# Patient Record
Sex: FEMALE | Race: WHITE | Employment: OTHER | URBAN - METROPOLITAN AREA
[De-identification: names, ages, dates, MRNs, and addresses within clinical notes are randomized per-mention and may not be internally consistent; named-entity substitution may affect disease eponyms.]

---

## 2017-02-14 PROBLEM — E78.00 HYPERCHOLESTEROLEMIA: Status: ACTIVE | Noted: 2017-02-14

## 2017-09-26 PROBLEM — F02.80 ALZHEIMER'S DEMENTIA WITHOUT BEHAVIORAL DISTURBANCE (HCC): Status: ACTIVE | Noted: 2017-09-26

## 2017-09-26 PROBLEM — G30.9 ALZHEIMER'S DEMENTIA WITHOUT BEHAVIORAL DISTURBANCE (HCC): Status: ACTIVE | Noted: 2017-09-26

## 2017-12-26 PROBLEM — E11.21 TYPE 2 DIABETES MELLITUS WITH NEPHROPATHY (HCC): Status: ACTIVE | Noted: 2017-12-26

## 2018-10-18 ENCOUNTER — HOSPITAL ENCOUNTER (OUTPATIENT)
Dept: ULTRASOUND IMAGING | Age: 73
Discharge: HOME | End: 2018-10-18
Attending: NURSE PRACTITIONER
Payer: COMMERCIAL

## 2018-10-18 DIAGNOSIS — Z87.440 HISTORY OF RECURRENT UTIS: ICD-10-CM

## 2018-10-18 DIAGNOSIS — R35.0 URINARY FREQUENCY: ICD-10-CM

## 2018-10-18 DIAGNOSIS — N39.0 CHRONIC UTI: Chronic | ICD-10-CM

## 2018-10-18 PROCEDURE — 76770 US EXAM ABDO BACK WALL COMP: CPT

## 2018-12-03 PROBLEM — B37.9 YEAST INFECTION: Status: ACTIVE | Noted: 2018-12-03

## 2018-12-03 PROBLEM — M19.90 ARTHRITIS: Status: ACTIVE | Noted: 2018-12-03

## 2020-11-13 PROBLEM — E11.21 TYPE 2 DIABETES MELLITUS WITH NEPHROPATHY (HCC): Chronic | Status: ACTIVE | Noted: 2017-12-26

## 2021-06-09 PROBLEM — Z00.00 MEDICARE ANNUAL WELLNESS VISIT, SUBSEQUENT: Status: ACTIVE | Noted: 2021-06-09

## 2021-06-09 PROBLEM — Z00.00 MEDICARE ANNUAL WELLNESS VISIT, SUBSEQUENT: Chronic | Status: ACTIVE | Noted: 2021-06-09

## 2021-07-09 PROBLEM — Z00.00 MEDICARE ANNUAL WELLNESS VISIT, SUBSEQUENT: Status: RESOLVED | Noted: 2021-06-09 | Resolved: 2021-07-09

## 2021-11-10 ENCOUNTER — TELEPHONE (OUTPATIENT)
Dept: DIABETES SERVICES | Age: 76
End: 2021-11-10

## 2021-11-18 ENCOUNTER — TELEPHONE (OUTPATIENT)
Dept: DIABETES SERVICES | Age: 76
End: 2021-11-18

## 2021-11-18 NOTE — TELEPHONE ENCOUNTER
Called to daughter Chapincito Mcbride as instructed about Diabetes Education. Left a message covered at 100%. We are calling to schedule education. Asked her to call back to schedule.

## 2021-11-29 ENCOUNTER — TELEPHONE (OUTPATIENT)
Dept: DIABETES SERVICES | Age: 76
End: 2021-11-29

## 2021-11-30 NOTE — TELEPHONE ENCOUNTER
Third attempt to contact pt/daughter regarding diabetes referral. Left message asking for a return call. If we don't hear by 12/6/21 we will send a letter. If we don't hear by 12/28/21 we will no longer pursue.

## 2022-03-19 PROBLEM — G30.9 ALZHEIMER'S DEMENTIA WITHOUT BEHAVIORAL DISTURBANCE (HCC): Status: ACTIVE | Noted: 2017-09-26

## 2022-03-19 PROBLEM — E78.00 HYPERCHOLESTEROLEMIA: Status: ACTIVE | Noted: 2017-02-14

## 2022-03-19 PROBLEM — F02.80 ALZHEIMER'S DEMENTIA WITHOUT BEHAVIORAL DISTURBANCE (HCC): Status: ACTIVE | Noted: 2017-09-26

## 2022-03-19 PROBLEM — M19.90 ARTHRITIS: Status: ACTIVE | Noted: 2018-12-03

## 2022-03-19 PROBLEM — B37.9 YEAST INFECTION: Status: ACTIVE | Noted: 2018-12-03

## 2022-03-20 PROBLEM — E11.21 TYPE 2 DIABETES MELLITUS WITH NEPHROPATHY (HCC): Status: ACTIVE | Noted: 2017-12-26
